# Patient Record
Sex: FEMALE | Race: WHITE | NOT HISPANIC OR LATINO | Employment: OTHER | URBAN - METROPOLITAN AREA
[De-identification: names, ages, dates, MRNs, and addresses within clinical notes are randomized per-mention and may not be internally consistent; named-entity substitution may affect disease eponyms.]

---

## 2022-08-04 DIAGNOSIS — R70.0 ELEVATED SEDIMENTATION RATE: ICD-10-CM

## 2022-08-04 DIAGNOSIS — R79.82 ELEVATED C-REACTIVE PROTEIN (CRP): Primary | ICD-10-CM

## 2022-08-12 DIAGNOSIS — M19.90 ARTHRITIS: Primary | ICD-10-CM

## 2025-02-10 ENCOUNTER — OFFICE VISIT (OUTPATIENT)
Dept: URGENT CARE | Facility: CLINIC | Age: 55
End: 2025-02-10
Payer: MEDICAID

## 2025-02-10 VITALS
DIASTOLIC BLOOD PRESSURE: 87 MMHG | OXYGEN SATURATION: 96 % | HEIGHT: 63 IN | TEMPERATURE: 99 F | SYSTOLIC BLOOD PRESSURE: 136 MMHG | HEART RATE: 92 BPM | RESPIRATION RATE: 19 BRPM | WEIGHT: 293 LBS | BODY MASS INDEX: 51.91 KG/M2

## 2025-02-10 DIAGNOSIS — Z76.0 MEDICATION REFILL: ICD-10-CM

## 2025-02-10 DIAGNOSIS — R05.9 COUGH, UNSPECIFIED TYPE: ICD-10-CM

## 2025-02-10 DIAGNOSIS — J10.1 INFLUENZA A: Primary | ICD-10-CM

## 2025-02-10 LAB
CTP QC/QA: YES
POC MOLECULAR INFLUENZA A AGN: POSITIVE
POC MOLECULAR INFLUENZA B AGN: NEGATIVE

## 2025-02-10 PROCEDURE — 99203 OFFICE O/P NEW LOW 30 MIN: CPT | Mod: S$GLB,,, | Performed by: NURSE PRACTITIONER

## 2025-02-10 PROCEDURE — 87502 INFLUENZA DNA AMP PROBE: CPT | Mod: QW,S$GLB,, | Performed by: NURSE PRACTITIONER

## 2025-02-10 RX ORDER — METOPROLOL SUCCINATE 50 MG/1
50 TABLET, EXTENDED RELEASE ORAL 2 TIMES DAILY
COMMUNITY

## 2025-02-10 RX ORDER — CYCLOBENZAPRINE HCL 5 MG
5 TABLET ORAL NIGHTLY PRN
COMMUNITY
Start: 2024-11-19

## 2025-02-10 RX ORDER — EZETIMIBE 10 MG/1
10 TABLET ORAL
COMMUNITY
Start: 2024-04-29

## 2025-02-10 RX ORDER — TOPIRAMATE 25 MG/1
25 TABLET ORAL 2 TIMES DAILY
COMMUNITY
Start: 2025-02-09

## 2025-02-10 RX ORDER — ALBUTEROL SULFATE 90 UG/1
2 INHALANT RESPIRATORY (INHALATION) EVERY 6 HOURS PRN
Qty: 18 G | Refills: 0 | Status: SHIPPED | OUTPATIENT
Start: 2025-02-10 | End: 2026-02-10

## 2025-02-10 RX ORDER — OSELTAMIVIR PHOSPHATE 75 MG/1
75 CAPSULE ORAL 2 TIMES DAILY
Qty: 10 CAPSULE | Refills: 0 | Status: SHIPPED | OUTPATIENT
Start: 2025-02-10 | End: 2025-02-15

## 2025-02-10 RX ORDER — METFORMIN HYDROCHLORIDE 500 MG/1
500 TABLET, EXTENDED RELEASE ORAL
COMMUNITY
Start: 2025-02-09

## 2025-02-10 NOTE — PROGRESS NOTES
"Subjective:      Patient ID: Jennifer Galvez is a 54 y.o. female.    Vitals:  height is 5' 3" (1.6 m) and weight is 183.1 kg (403 lb 10.6 oz) (abnormal). Her tympanic temperature is 99.4 °F (37.4 °C). Her blood pressure is 136/87 and her pulse is 92. Her respiration is 19 and oxygen saturation is 96%.     Chief Complaint: Cough    Patient is coming in for a cough, sore throat and some body aches.    Cough  This is a new problem. The current episode started yesterday. The problem has been unchanged. The problem occurs constantly. The cough is Productive of sputum. Associated symptoms include chills, postnasal drip and a sore throat. Nothing aggravates the symptoms. She has tried OTC cough suppressant for the symptoms. The treatment provided no relief. There is no history of bronchitis.       Constitution: Positive for chills.   HENT:  Positive for postnasal drip and sore throat.    Respiratory:  Positive for cough.       Objective:     Physical Exam   Constitutional: She is oriented to person, place, and time. She is cooperative.  Non-toxic appearance. She appears ill. No distress. morbidly obese  HENT:   Head: Normocephalic and atraumatic.   Ears:   Right Ear: Tympanic membrane, external ear and ear canal normal.   Left Ear: Tympanic membrane, external ear and ear canal normal.   Nose: Mucosal edema, rhinorrhea and congestion present. No nasal deformity. No epistaxis. Right sinus exhibits no maxillary sinus tenderness and no frontal sinus tenderness. Left sinus exhibits no maxillary sinus tenderness and no frontal sinus tenderness.   Mouth/Throat: Uvula is midline, oropharynx is clear and moist and mucous membranes are normal. No trismus in the jaw. Normal dentition. No uvula swelling. No oropharyngeal exudate, posterior oropharyngeal edema or posterior oropharyngeal erythema.   Eyes: Conjunctivae and lids are normal. No scleral icterus.   Neck: Trachea normal and phonation normal. Neck supple. No edema " present. No erythema present. No neck rigidity present.   Cardiovascular: Normal rate, regular rhythm and normal pulses.   Pulmonary/Chest: Effort normal. No tachypnea. No respiratory distress. She has no decreased breath sounds. She has wheezes (Cleared with cough) in the right upper field. She has no rhonchi. She has no rales.   Musculoskeletal: Normal range of motion.         General: No deformity. Normal range of motion.   Neurological: She is alert and oriented to person, place, and time. She exhibits normal muscle tone. Coordination normal.   Skin: Skin is warm, dry, intact, not diaphoretic and not pale.   Psychiatric: Her speech is normal and behavior is normal. Judgment and thought content normal.   Nursing note and vitals reviewed.      Assessment:     1. Influenza A    2. Cough, unspecified type    3. Medication refill        Plan:     Results for orders placed or performed in visit on 02/10/25   POCT Influenza A/B MOLECULAR    Collection Time: 02/10/25 10:59 AM   Result Value Ref Range    POC Molecular Influenza A Ag Positive (A) Negative    POC Molecular Influenza B Ag Negative Negative     Acceptable Yes         Influenza A  -     oseltamivir (TAMIFLU) 75 MG capsule; Take 1 capsule (75 mg total) by mouth 2 (two) times daily. for 5 days  Dispense: 10 capsule; Refill: 0    Cough, unspecified type  -     POCT Influenza A/B MOLECULAR    Medication refill  -     albuterol (VENTOLIN HFA) 90 mcg/actuation inhaler; Inhale 2 puffs into the lungs every 6 (six) hours as needed for Wheezing. Rescue  Dispense: 18 g; Refill: 0

## 2025-04-28 ENCOUNTER — OFFICE VISIT (OUTPATIENT)
Dept: URGENT CARE | Facility: CLINIC | Age: 55
End: 2025-04-28
Payer: MEDICAID

## 2025-04-28 VITALS
SYSTOLIC BLOOD PRESSURE: 135 MMHG | HEART RATE: 95 BPM | OXYGEN SATURATION: 88 % | TEMPERATURE: 100 F | HEIGHT: 63 IN | BODY MASS INDEX: 51.91 KG/M2 | DIASTOLIC BLOOD PRESSURE: 76 MMHG | WEIGHT: 293 LBS | RESPIRATION RATE: 20 BRPM

## 2025-04-28 DIAGNOSIS — R06.00 DYSPNEA, UNSPECIFIED TYPE: Primary | ICD-10-CM

## 2025-04-28 NOTE — PROGRESS NOTES
"Subjective:      Patient ID: Jennifer Galvez is a 54 y.o. female.    Vitals:  height is 5' 3" (1.6 m) and weight is 182.8 kg (403 lb) (abnormal). Her oral temperature is 99.5 °F (37.5 °C). Her blood pressure is 135/76 and her pulse is 95. Her respiration is 20 and oxygen saturation is 88% (abnormal).     Chief Complaint: Cough    53 y/o female presents to clinic with cough, sinus problem and SOB since 4/25. She had the Flu a few months ago, patient has been diagnosed with sleep apnea since then and is concerned of pneumonia. Coughing frequently and her ribs are hurting. Has a pulse oximeter at home and states it hasn't been getting to the level it is supposed to since she had COVID. 02 was in the 50s and 40s for her CPAP test and it has been in the low 90s and high 80s lately.     Cough  This is a new problem. The current episode started in the past 7 days. The problem has been rapidly worsening. The cough is Productive of sputum. Associated symptoms include nasal congestion, rhinorrhea, shortness of breath and wheezing. Pertinent negatives include no chills, ear pain, fever, headaches or sweats. Associated symptoms comments: Chest tightness, sneezing, lightheadedness . Treatments tried: Mucinex, Albuterol inhaler. The treatment provided mild relief. There is no history of asthma, bronchiectasis, bronchitis, COPD, emphysema, environmental allergies or pneumonia. sleep apnea       Constitution: Negative. Negative for chills and fever.   HENT:  Negative for ear pain.    Cardiovascular: Negative.    Eyes: Negative.    Respiratory:  Positive for cough, shortness of breath and wheezing.    Gastrointestinal: Negative.    Endocrine: negative.   Genitourinary: Negative.    Musculoskeletal: Negative.    Skin: Negative.    Allergic/Immunologic: Negative.  Negative for environmental allergies.   Neurological: Negative.  Negative for headaches.   Hematologic/Lymphatic: Negative.    Psychiatric/Behavioral: Negative.      "   Objective:     Physical Exam   Constitutional: She is oriented to person, place, and time. She appears well-developed. She is cooperative.  Non-toxic appearance. She does not appear ill. No distress.   HENT:   Head: Normocephalic and atraumatic.   Ears:   Right Ear: Hearing, tympanic membrane, external ear and ear canal normal.   Left Ear: Hearing, tympanic membrane, external ear and ear canal normal.   Nose: Nose normal. No mucosal edema, rhinorrhea or nasal deformity. No epistaxis. Right sinus exhibits no maxillary sinus tenderness and no frontal sinus tenderness. Left sinus exhibits no maxillary sinus tenderness and no frontal sinus tenderness.   Mouth/Throat: Uvula is midline, oropharynx is clear and moist and mucous membranes are normal. No trismus in the jaw. Normal dentition. No uvula swelling. No posterior oropharyngeal erythema.   Eyes: Conjunctivae and lids are normal. Right eye exhibits no discharge. Left eye exhibits no discharge. No scleral icterus.   Neck: Trachea normal and phonation normal. Neck supple.   Cardiovascular: Normal rate, regular rhythm, normal heart sounds and normal pulses.   Pulmonary/Chest: Effort normal and breath sounds normal. No respiratory distress.   Abdominal: Normal appearance and bowel sounds are normal. She exhibits no distension and no mass. Soft. There is no abdominal tenderness.   Musculoskeletal: Normal range of motion.         General: No deformity. Normal range of motion.   Neurological: She is alert and oriented to person, place, and time. She exhibits normal muscle tone. Coordination normal.   Skin: Skin is warm, dry, intact, not diaphoretic and not pale.   Psychiatric: Her speech is normal and behavior is normal. Judgment and thought content normal.   Nursing note and vitals reviewed.      Assessment:     1. Dyspnea, unspecified type        Plan:       Dyspnea, unspecified type  -     Refer to Emergency Dept.      YOUR CONDITION IS POTENTIALLY LIFE  THREATENING.  GO TO NEAREST ER NOW FOR FURTHER EVALUATION AND TREATMENT.    Patient was offered transfer by [facility].  Patient declines ambulance transport and will go by private auto.  The risks of this decision were explained to patient.         Patient comes in complaining of severe SOB, no chest pain.  Has extensive history of COPD, DM.  No wheezing heard on exam.  After consideration of above, will transfer patient to [facility] ER for further evaluation and treatment.  Offered and strongly recommended [facility] ambulance transfer, patient refuses ambulance, will go by private auto.

## 2025-09-02 ENCOUNTER — OFFICE VISIT (OUTPATIENT)
Dept: URGENT CARE | Facility: CLINIC | Age: 55
End: 2025-09-02
Payer: MEDICAID

## 2025-09-02 VITALS
DIASTOLIC BLOOD PRESSURE: 75 MMHG | SYSTOLIC BLOOD PRESSURE: 139 MMHG | HEIGHT: 63 IN | OXYGEN SATURATION: 95 % | WEIGHT: 293 LBS | HEART RATE: 85 BPM | BODY MASS INDEX: 51.91 KG/M2 | RESPIRATION RATE: 16 BRPM | TEMPERATURE: 98 F

## 2025-09-02 DIAGNOSIS — J02.9 SORE THROAT: ICD-10-CM

## 2025-09-02 DIAGNOSIS — H65.03 NON-RECURRENT ACUTE SEROUS OTITIS MEDIA OF BOTH EARS: ICD-10-CM

## 2025-09-02 DIAGNOSIS — J03.90 EXUDATIVE TONSILLITIS: Primary | ICD-10-CM

## 2025-09-02 LAB
CTP QC/QA: YES
CTP QC/QA: YES
HETEROPH AB SER QL: NEGATIVE
MOLECULAR STREP A: NEGATIVE

## 2025-09-02 PROCEDURE — 87651 STREP A DNA AMP PROBE: CPT | Mod: QW,S$GLB,,

## 2025-09-02 PROCEDURE — 99213 OFFICE O/P EST LOW 20 MIN: CPT | Mod: S$GLB,,,

## 2025-09-02 PROCEDURE — 86308 HETEROPHILE ANTIBODY SCREEN: CPT | Mod: QW,S$GLB,,

## 2025-09-02 RX ORDER — PREDNISONE 20 MG/1
TABLET ORAL
Qty: 4 TABLET | Refills: 0 | Status: SHIPPED | OUTPATIENT
Start: 2025-09-02 | End: 2025-09-07

## 2025-09-02 RX ORDER — AMOXICILLIN AND CLAVULANATE POTASSIUM 875; 125 MG/1; MG/1
1 TABLET, FILM COATED ORAL EVERY 12 HOURS
Qty: 14 TABLET | Refills: 0 | Status: SHIPPED | OUTPATIENT
Start: 2025-09-02 | End: 2025-09-09